# Patient Record
Sex: FEMALE
[De-identification: names, ages, dates, MRNs, and addresses within clinical notes are randomized per-mention and may not be internally consistent; named-entity substitution may affect disease eponyms.]

---

## 2018-08-02 ENCOUNTER — HOSPITAL ENCOUNTER (OUTPATIENT)
Dept: HOSPITAL 5 - US | Age: 43
Discharge: HOME | End: 2018-08-02
Attending: INTERNAL MEDICINE
Payer: OTHER GOVERNMENT

## 2018-08-02 DIAGNOSIS — R10.11: Primary | ICD-10-CM

## 2018-08-02 DIAGNOSIS — K30: ICD-10-CM

## 2018-08-02 PROCEDURE — 76700 US EXAM ABDOM COMPLETE: CPT

## 2018-08-03 NOTE — ULTRASOUND REPORT
ULTRASOUND ABDOMEN COMPLETE:



TECHNIQUE:  Transabdominal ultrasound with color Doppler interrogation.



HISTORY: Right upper quadrant pain, functional dyspepsia.



COMPARISON: none.







FINDINGS:



LIVER: Normal.



BILIARY SYSTEM: There appears to be a small amount of sludge in the 

gallbladder. No shadowing gallstones are identified. The gallbladder is 

not distended. Normal wall thickness. No pericholecystic fluid. The CBD 

measures 3.2 mm.



PANCREAS: Normal.



SPLEEN: Normal. 8.5 cm in length.



KIDNEYS: Normal.



AORTA/IVC: Normal.



ASCITES: None.







IMPRESSION:

Small amount of sludge in the gallbladder. No evidence for acute 

cholecystitis or biliary dilatation. Otherwise normal exam.